# Patient Record
Sex: MALE | Race: BLACK OR AFRICAN AMERICAN | Employment: PART TIME | URBAN - METROPOLITAN AREA
[De-identification: names, ages, dates, MRNs, and addresses within clinical notes are randomized per-mention and may not be internally consistent; named-entity substitution may affect disease eponyms.]

---

## 2022-06-07 ENCOUNTER — APPOINTMENT (OUTPATIENT)
Dept: CT IMAGING | Age: 22
End: 2022-06-07
Payer: COMMERCIAL

## 2022-06-07 ENCOUNTER — HOSPITAL ENCOUNTER (EMERGENCY)
Age: 22
Discharge: HOME | End: 2022-06-07
Attending: EMERGENCY MEDICINE
Payer: COMMERCIAL

## 2022-06-07 VITALS
RESPIRATION RATE: 14 BRPM | HEART RATE: 65 BPM | TEMPERATURE: 98.2 F | SYSTOLIC BLOOD PRESSURE: 121 MMHG | DIASTOLIC BLOOD PRESSURE: 68 MMHG | OXYGEN SATURATION: 100 %

## 2022-06-07 DIAGNOSIS — R31.9 URINARY TRACT INFECTION WITH HEMATURIA, SITE UNSPECIFIED: Primary | ICD-10-CM

## 2022-06-07 DIAGNOSIS — N39.0 URINARY TRACT INFECTION WITH HEMATURIA, SITE UNSPECIFIED: Primary | ICD-10-CM

## 2022-06-07 LAB
ANION GAP SERPL CALCULATED.3IONS-SCNC: 13 MMOL/L (ref 3–16)
BACTERIA: ABNORMAL /HPF
BASOPHILS ABSOLUTE: 0 K/UL (ref 0–0.2)
BASOPHILS RELATIVE PERCENT: 0.6 %
BILIRUBIN URINE: NEGATIVE
BLOOD, URINE: ABNORMAL
BUN BLDV-MCNC: 14 MG/DL (ref 7–20)
CALCIUM SERPL-MCNC: 9.7 MG/DL (ref 8.3–10.6)
CHLORIDE BLD-SCNC: 102 MMOL/L (ref 99–110)
CLARITY: CLEAR
CO2: 22 MMOL/L (ref 21–32)
COLOR: YELLOW
CREAT SERPL-MCNC: 1 MG/DL (ref 0.9–1.3)
EOSINOPHILS ABSOLUTE: 0.1 K/UL (ref 0–0.6)
EOSINOPHILS RELATIVE PERCENT: 0.9 %
EPITHELIAL CELLS, UA: 0 /HPF (ref 0–5)
GFR AFRICAN AMERICAN: >60
GFR NON-AFRICAN AMERICAN: >60
GLUCOSE BLD-MCNC: 82 MG/DL (ref 70–99)
GLUCOSE URINE: NEGATIVE MG/DL
HCT VFR BLD CALC: 45.8 % (ref 40.5–52.5)
HEMOGLOBIN: 15.6 G/DL (ref 13.5–17.5)
HYALINE CASTS: 0 /LPF (ref 0–8)
KETONES, URINE: NEGATIVE MG/DL
LEUKOCYTE ESTERASE, URINE: ABNORMAL
LYMPHOCYTES ABSOLUTE: 1.8 K/UL (ref 1–5.1)
LYMPHOCYTES RELATIVE PERCENT: 31.6 %
MCH RBC QN AUTO: 31.8 PG (ref 26–34)
MCHC RBC AUTO-ENTMCNC: 34 G/DL (ref 31–36)
MCV RBC AUTO: 93.4 FL (ref 80–100)
MICROSCOPIC EXAMINATION: YES
MONOCYTES ABSOLUTE: 0.4 K/UL (ref 0–1.3)
MONOCYTES RELATIVE PERCENT: 7.4 %
NEUTROPHILS ABSOLUTE: 3.4 K/UL (ref 1.7–7.7)
NEUTROPHILS RELATIVE PERCENT: 59.5 %
NITRITE, URINE: NEGATIVE
PDW BLD-RTO: 13.4 % (ref 12.4–15.4)
PH UA: 7 (ref 5–8)
PLATELET # BLD: 235 K/UL (ref 135–450)
PMV BLD AUTO: 7.5 FL (ref 5–10.5)
POTASSIUM REFLEX MAGNESIUM: 4.3 MMOL/L (ref 3.5–5.1)
PROTEIN UA: 30 MG/DL
RBC # BLD: 4.91 M/UL (ref 4.2–5.9)
RBC UA: 11 /HPF (ref 0–4)
SODIUM BLD-SCNC: 137 MMOL/L (ref 136–145)
SPECIFIC GRAVITY UA: 1.02 (ref 1–1.03)
URINE REFLEX TO CULTURE: YES
URINE TYPE: ABNORMAL
UROBILINOGEN, URINE: 2 E.U./DL
WBC # BLD: 5.6 K/UL (ref 4–11)
WBC UA: 93 /HPF (ref 0–5)

## 2022-06-07 PROCEDURE — 87086 URINE CULTURE/COLONY COUNT: CPT

## 2022-06-07 PROCEDURE — 81001 URINALYSIS AUTO W/SCOPE: CPT

## 2022-06-07 PROCEDURE — 87491 CHLMYD TRACH DNA AMP PROBE: CPT

## 2022-06-07 PROCEDURE — 85025 COMPLETE CBC W/AUTO DIFF WBC: CPT

## 2022-06-07 PROCEDURE — 87591 N.GONORRHOEAE DNA AMP PROB: CPT

## 2022-06-07 PROCEDURE — 36415 COLL VENOUS BLD VENIPUNCTURE: CPT

## 2022-06-07 PROCEDURE — 74176 CT ABD & PELVIS W/O CONTRAST: CPT

## 2022-06-07 PROCEDURE — 6360000004 HC RX CONTRAST MEDICATION: Performed by: EMERGENCY MEDICINE

## 2022-06-07 PROCEDURE — 80048 BASIC METABOLIC PNL TOTAL CA: CPT

## 2022-06-07 PROCEDURE — 99285 EMERGENCY DEPT VISIT HI MDM: CPT

## 2022-06-07 PROCEDURE — 74177 CT ABD & PELVIS W/CONTRAST: CPT

## 2022-06-07 RX ORDER — SULFAMETHOXAZOLE AND TRIMETHOPRIM 800; 160 MG/1; MG/1
1 TABLET ORAL 2 TIMES DAILY
Qty: 20 TABLET | Refills: 0 | Status: SHIPPED | OUTPATIENT
Start: 2022-06-07 | End: 2022-06-17

## 2022-06-07 RX ADMIN — IOPAMIDOL 75 ML: 755 INJECTION, SOLUTION INTRAVENOUS at 13:41

## 2022-06-07 ASSESSMENT — ENCOUNTER SYMPTOMS
ABDOMINAL PAIN: 1
SORE THROAT: 0
DIARRHEA: 0
BACK PAIN: 0
VOMITING: 0
NAUSEA: 0
CONSTIPATION: 0
SHORTNESS OF BREATH: 0

## 2022-06-07 ASSESSMENT — PAIN - FUNCTIONAL ASSESSMENT: PAIN_FUNCTIONAL_ASSESSMENT: NONE - DENIES PAIN

## 2022-06-07 NOTE — ED PROVIDER NOTES
Attending Supervisory Note/Shared Visit   I have personally performed a face to face diagnostic evaluation on this patient. I have reviewed the mid-levels findings and agree. History and Exam by me shows an alert thin black male in no acute distress. Complaining of some intermittent pain in his right flank/side. He states he mainly notices at work. He states she does a lot of bending and lifting. He is not having any discomfort at this time. Is been going on for about a week. For that same period of time he is also noted that he is urinating more frequently than usual.  No discomfort with urination. No penile discharge. No testicular pain or swelling. General: Alert thin black male in no acute distress. Heart: Regular rate and rhythm. No murmurs or gallops noted. Lungs: Breath sounds equal bilaterally and clear. Abdomen: Scaphoid, soft, nontender. No masses organomegaly. Bowel sounds are normal.  No flank tenderness. Lab reviewed. H&H are normal.  White blood cell count 5600 with 60 neutrophils and 32 lymphs. Electrolytes BUN and creatinine are normal.  Urinalysis shows 93 white cells 11 red cells. CT abdomen pelvis with IV contrast: Apparent tubular structure noted in the right lower quadrant with some gas measuring up to 10 mm. This may represent an inflamed appendix. Recommend CT scan with oral contrast to make certain that this represents the appendix. CT abdomen pelvis with oral contrast: No acute abnormalities in the abdomen and pelvis. Normal appendix and gastrointestinal tract. This patient presents with symptoms as above. His initial CT noted some concern for possible appendicitis. A repeat CT scan was done with oral contrast on recommendation of radiology and consultation with general surgery. Repeat CT scan with oral contrast shows no evidence of appendicitis. He has no abdominal tenderness on exam.  My index of suspicion for appendicitis is low.   He has significant pyuria and hematuria. His urine will be cultured. He will be put on Bactrim DS. He will be given a primary care referral for follow-up for recheck and further evaluation. Urine DNA probe is also pending. Test results, diagnosis, and treatment plan were discussed the patient. He understands treatment plan follow-up as discussed.         (Please note that portions of this note were completed with a voice recognition program.  Efforts were made to edit the dictations but occasionally words are mis-transcribed.)    Shashi Muniz MD  Attending Emergency Physician        Martell Mendiola MD  06/07/22 0600

## 2022-06-07 NOTE — ED PROVIDER NOTES
[address] [facility]      Pt Name: Logan Toledo  MRN: [id number]  Memogftrinh 2000  Date of evaluation: 6/7/2022  Provider: Mirian Cespedes PA-C    This patient was seen and evaluated by the attending physician No att. providers found. CHIEF COMPLAINT      Chief Complaint: Abdominal pain      HISTORYOF PRESENT ILLNESS  (Location/Symptom, Timing/Onset, Context/Setting, Quality, Duration, Modifying Factors, Severity.)   Wilmar Mora is a 23 y.o. male who presents to the emergency department complaining of right lower quadrant abdominal pain. The patient says the pain started sometime last week and has been intermittent since onset. He does heavy lifting at work and he really only notices it while at work when twisting and turning and lifting. He denies the pain at this time. He says he has had no nausea, vomiting, diarrhea or constipation. He does however report urinary frequency and decreased urine output. He says that he feels like he has to go to the bathroom just about every hour and only a little bit comes out. He says it is not quite a full stream but somewhat dribbling. He denies dysuria or penile discharge. He denies any testicular pain or swelling. No fevers or chills. Nursing Notes were reviewed and I agree. REVIEW OF SYSTEMS    (2-9 systems for level 4, 10 or more forlevel 5)     Review of Systems   Constitutional: Negative for chills and fever. HENT: Negative for sore throat. Respiratory: Negative for shortness of breath. Cardiovascular: Negative for chest pain. Gastrointestinal: Positive for abdominal pain. Negative for constipation, diarrhea, nausea and vomiting. Genitourinary: Positive for decreased urine volume, difficulty urinating and frequency. Negative for dysuria, scrotal swelling and testicular pain. Musculoskeletal: Negative for back pain. Skin: Negative for rash.    Neurological: Negative for light-headedness and headaches. Psychiatric/Behavioral: The patient is not nervous/anxious. All other systems reviewed and are negative. Positives and Pertinent negatives as per HPI. Except as noted above the remainder of the review of systems was reviewed and negative. PAST MEDICALHISTORY   No past medical history on file. SURGICAL HISTORY     No past surgical history on file. CURRENT MEDICATIONS       Discharge Medication List as of 6/7/2022  8:08 PM          ALLERGIES     Patient has no known allergies. FAMILY HISTORY     No family history on file. No family status information on file. SOCIAL HISTORY        PHYSICAL EXAM    (up to 7 for level 4, 8 or more for level 5)     ED Triage Vitals [06/07/22 1119]   BP Temp Temp Source Heart Rate Resp SpO2 Height Weight   119/77 98.2 °F (36.8 °C) Oral 63 17 100 % -- --       Physical Exam  Vitals and nursing note reviewed. Constitutional:       General: He is not in acute distress. Appearance: He is well-developed. HENT:      Head: Normocephalic and atraumatic. Cardiovascular:      Rate and Rhythm: Normal rate and regular rhythm. Heart sounds: Normal heart sounds. Pulmonary:      Effort: Pulmonary effort is normal. No respiratory distress. Breath sounds: Normal breath sounds. Abdominal:      General: Abdomen is flat. There is no distension. Palpations: Abdomen is soft. Tenderness: There is no abdominal tenderness. Musculoskeletal:         General: Normal range of motion. Cervical back: Neck supple. Skin:     General: Skin is warm and dry. Neurological:      Mental Status: He is alert and oriented to person, place, and time.    Psychiatric:         Behavior: Behavior normal.            DIAGNOSTIC RESULTS     When ordered, EKGs are interpreted by the Emergency Department Physician in the absence of a cardiologist. Please see their note for interpretation of EKG    RADIOLOGY: Interpretation per the Radiologist below, if available at the time of this note:    CT ABDOMEN PELVIS WO CONTRAST Additional Contrast? Oral   Final Result   No acute abnormalities in the abdomen or pelvis. As there is contrast in the   renal collecting systems, ureters and urinary bladder presence of small   calculi cannot be determined. Normal appendix and gastrointestinal tract. CT ABDOMEN PELVIS W IV CONTRAST Additional Contrast? None   Final Result   Apparent tubular structure noted in the right lower quadrant, with some gas,   measuring up to 10 mm. This may represent an inflamed appendix. However, I   recommend CT scan with oral contrast to make certain that this represents the   appendix. Examination is otherwise unremarkable. LABS:  Labs Reviewed   URINALYSIS WITH REFLEX TO CULTURE - Abnormal; Notable for the following components:       Result Value    Blood, Urine SMALL (*)     Protein, UA 30 (*)     Urobilinogen, Urine 2.0 (*)     Leukocyte Esterase, Urine TRACE (*)     All other components within normal limits   MICROSCOPIC URINALYSIS - Abnormal; Notable for the following components:    WBC, UA 93 (*)     RBC, UA 11 (*)     All other components within normal limits   CULTURE, URINE    Narrative:     ORDER#: [id number]                          ORDERED BY: ARSENIO ASH  SOURCE: Urine Clean Catch                  COLLECTED:  06/07/22 11:29  ANTIBIOTICS AT TOYIN.:                      RECEIVED :  06/07/22 12:10   C. TRACHOMATIS N.GONORRHOEAE DNA, URINE   CBC WITH AUTO DIFFERENTIAL   BASIC METABOLIC PANEL W/ REFLEX TO MG FOR LOW K       When ordered, only abnormal lab results are displayed. All other labs are within normal range or not returned as of the dictation.     EMERGENCY DEPARTMENT COURSE and DIFFERENTIAL DIAGNOSIS/MDM:   Vitals:    Vitals:    06/07/22 1119 06/07/22 2015   BP: 119/77 121/68   Pulse: 63 65   Resp: 17 14   Temp: 98.2 °F (36.8 °C)    TempSrc: Oral SpO2: 100% 100%       I saw this patient with Dr. Vadim Steele who spent face-to-face time with the patient and agreed with my assessment and plan. The patient was stable and nontoxic appearing. My initial thought with this patient was that he has abdominal wall strain given that his pain is only present with lifting and twisting however his urinary complaints were concerning and his urine showed 93 white blood cells and 11 red blood cells. I did add on a gonorrhea and chlamydia culture and the urine was sent for culture. A postvoid residual was measured because the patient had expressed that he did not feel he was emptying his bladder fully and he had a 117 mL still in the bladder. Because of these unusual findings in a 47-year-old male, a CT abdomen pelvis with IV contrast was performed. This was read by the radiologist as. Possible inflamed appendix. It shows apparent tubular structure noted in the right lower quadrant with some gas measuring up to 10 mm. The radiologist is recommending CT with oral contrast to make certain that this represents the appendix. Patient's story really does not fit with appendicitis. I did discuss the case with Dr. Luci Hughes with general surgery who reviewed the images with one of the radiologists. He is also recommending that the patient received oral contrast and then 3 hours after oral contrast performing the CT again. I did discuss this with the patient and he is agreeable. Repeat CT with oral contrast showed no acute abnormality with normal appendix and GI tract. Patient will be treated for UTI with Bactrim and referred to PCP for close outpatient follow-up. Is this patient to be included in the SEP-1 Core Measure due to severe sepsis or septic shock? No   Exclusion criteria - the patient is NOT to be included for SEP-1 Core Measure due to:  2+ SIRS criteria are not met          PROCEDURES:  None    FINAL IMPRESSION      1.  Urinary tract infection with hematuria, site unspecified          DISPOSITION/PLAN   DISPOSITION Decision To Discharge 06/07/2022 07:48:14 PM      PATIENT REFERRED TO:  [facility]) Pre-Services  [phone]          MEDICATIONS:  Discharge Medication List as of 6/7/2022  8:08 PM      START taking these medications    Details   sulfamethoxazole-trimethoprim (BACTRIM DS) 800-160 MG per tablet Take 1 tablet by mouth 2 times daily for 10 days, Disp-20 tablet, R-0Print             (Please note that portions of this note were completed with a voice recognition program.  Efforts were made toedit the dictations but occasionally words are mis-transcribed.)    LING Watkins PA-C  06/08/22 7238

## 2022-06-08 LAB
C. TRACHOMATIS DNA ,URINE: POSITIVE
N. GONORRHOEAE DNA, URINE: NEGATIVE
URINE CULTURE, ROUTINE: NORMAL